# Patient Record
Sex: MALE | Race: WHITE
[De-identification: names, ages, dates, MRNs, and addresses within clinical notes are randomized per-mention and may not be internally consistent; named-entity substitution may affect disease eponyms.]

---

## 2017-11-06 NOTE — EDM.PDOC
ED HPI GENERAL MEDICAL PROBLEM





- General


Chief Complaint: General


Stated Complaint: FACIAL SWELLING


Time Seen by Provider: 11/06/17 05:30


Source of Information: Reports: Patient


History Limitations: Reports: No Limitations





- History of Present Illness


INITIAL COMMENTS - FREE TEXT/NARRATIVE: 





Patient is a 51 year old man who has had a swelling of the left face over the 

maxillary sinus. He has no pain but has had tooth abscesses in the past.  No 

fever or chills but there is pressure under the left eye. No nasal drainage.


Onset: Gradual


Onset Date: 11/05/17


Onset Time: 20:00


Duration: Day(s): (1), Getting Worse


Location: Reports: Face


Quality: Reports: Dull


Severity: Mild


Improves with: Reports: Heat Therapy


Context: Reports: Other (History of this happenning last year at the same time.)


Associated Symptoms: Reports: No Other Symptoms


Treatments PTA: Reports: Other (see below) (Heat pack.)





- Related Data


 Allergies











Allergy/AdvReac Type Severity Reaction Status Date / Time


 


No Known Allergies Allergy   Verified 11/06/17 05:37











Home Meds: 


 Home Meds





NK [No Known Home Meds]  11/06/17 [History]











ED ROS GENERAL





- Review of Systems


Review Of Systems: See Below


Constitutional: Reports: No Symptoms


HEENT: Reports: Other (Face pain.)


Respiratory: Reports: No Symptoms


Cardiovascular: Reports: No Symptoms


Endocrine: Reports: No Symptoms


GI/Abdominal: Reports: No Symptoms


: Reports: No Symptoms


Musculoskeletal: Reports: No Symptoms


Skin: Reports: No Symptoms


Neurological: Reports: No Symptoms


Psychiatric: Reports: No Symptoms


Hematologic/Lymphatic: Reports: No Symptoms


Immunologic: Reports: No Symptoms





ED EXAM, GENERAL





- Physical Exam


Exam: See Below


Exam Limited By: No Limitations


General Appearance: Alert, WD/WN, No Apparent Distress


Eye Exam: Bilateral Eye: EOMI, Normal Fundi, Normal Inspection, PERRL


Ears: Normal External Exam, Normal Canal, Hearing Grossly Normal, Normal TMs


Ear Exam: Bilateral Ear: Auricle Normal, Canal Normal, TM normal


Nose: Normal Inspection, Normal Mucosa, No Blood


Throat/Mouth: Normal Inspection, Normal Lips, Normal Teeth, Normal Gums, Normal 

Oropharynx, Normal Voice, No Airway Compromise


Head: Facial Swelling (He has mild swelling over the left maxillary sinus area 

and swelling of the left upper gums in his mouth.)


Neck: Normal Inspection, Supple, Non-Tender, Full Range of Motion


Respiratory/Chest: No Respiratory Distress, Lungs Clear, Normal Breath Sounds, 

No Accessory Muscle Use, Chest Non-Tender


Cardiovascular: Normal Peripheral Pulses, Regular Rate, Rhythm, No Edema, No 

Gallop, No JVD, No Murmur, No Rub


GI/Abdominal: Normal Bowel Sounds, Soft, Non-Tender, No Organomegaly, No 

Distention, No Abnormal Bruit, No Mass


Extremities: Normal Inspection, Normal Range of Motion, Non-Tender, Normal 

Capillary Refill, No Pedal Edema


Neurological: Alert, Oriented, CN II-XII Intact, Normal Cognition, Normal Gait, 

Normal Reflexes, No Motor/Sensory Deficits


Psychiatric: Normal Affect, Normal Mood


Skin Exam: Warm


Lymphatic: No Adenopathy





Course





- Vital Signs


Text/Narrative:: 





Uneventful ED course. This is most likely a dental abscess that may be causing 

a sinusitis.  He will see a dentist ASAP.  He will be put on Augmentin 875 mg 

po bid x 10 days, hot pack the left side of his face and take tylenol 500 mg po 

q 4 hours or Ibuprofen 800 mg po q 6 hours for pain.  Return to ED if worsening.





Departure





- Departure


Time of Disposition: 06:01


Disposition: Home, Self-Care 01


Condition: Good


Clinical Impression: 


 Dental abscess, Sinusitis








- Discharge Information


Forms:  ED Department Discharge

## 2019-07-29 ENCOUNTER — HOSPITAL ENCOUNTER (EMERGENCY)
Dept: HOSPITAL 60 - LB.ED | Age: 54
Discharge: HOME | End: 2019-07-29
Payer: COMMERCIAL

## 2019-07-29 DIAGNOSIS — Z79.899: ICD-10-CM

## 2019-07-29 DIAGNOSIS — R07.1: Primary | ICD-10-CM

## 2019-07-29 PROCEDURE — 80053 COMPREHEN METABOLIC PANEL: CPT

## 2019-07-29 PROCEDURE — 93005 ELECTROCARDIOGRAM TRACING: CPT

## 2019-07-29 PROCEDURE — 85025 COMPLETE CBC W/AUTO DIFF WBC: CPT

## 2019-07-29 PROCEDURE — 99285 EMERGENCY DEPT VISIT HI MDM: CPT

## 2019-07-29 PROCEDURE — 84484 ASSAY OF TROPONIN QUANT: CPT

## 2019-07-29 PROCEDURE — 71045 X-RAY EXAM CHEST 1 VIEW: CPT

## 2019-07-29 PROCEDURE — 36415 COLL VENOUS BLD VENIPUNCTURE: CPT

## 2019-07-29 NOTE — EDM.PDOC
ED HPI GENERAL MEDICAL PROBLEM





- General


Chief Complaint: Chest Pain


Stated Complaint: CHEST PAIN


Time Seen by Provider: 07/29/19 10:55


Source of Information: Reports: Patient, RN


History Limitations: Reports: No Limitations





- History of Present Illness


INITIAL COMMENTS - FREE TEXT/NARRATIVE: 


53 yr male presents with chest pain, midsternal.  Pain started with using the 

chain saw today.  He does take Atorvastatin X 15 years.  Hx of tia.  NSR noted 

per EKG, no ST changes noted. Chest x-ray completed. Labs drawn.  Pain with 

movement of sitting forward for x-ray.  No shortness of breath today with this.

  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 

boat, but subsided easily.  Today the pain didn't go away.  Pain was 4-5/10 and 

now 2/10.  He did take Aleve 2 tabs this am. He does have hx of right wrist 

pain and neck pain and states he took the Aleve for this pain.  








- Related Data


 Allergies











Allergy/AdvReac Type Severity Reaction Status Date / Time


 


No Known Allergies Allergy   Verified 07/29/19 10:51











Home Meds: 


 Home Meds





atorvaSTATin [Lipitor] 20 mg PO DAILY 07/29/19 [History]











ED ROS GENERAL





- Review of Systems


Review Of Systems: See Below


Constitutional: Denies: Fever, Decreased Appetite


HEENT: Denies: Sinus Problem, Throat Pain, Throat Swelling


Respiratory: Denies: Shortness of Breath, Wheezing, Cough


Cardiovascular: Reports: Chest Pain, Other (Tia in 2001).  Denies: Dyspnea on 

Exertion, Edema, Lightheadedness


Endocrine: Reports: No Symptoms


GI/Abdominal: Reports: No Symptoms


: Reports: No Symptoms


Musculoskeletal: Reports: Neck Pain, Other (right thumb pain)


Skin: Reports: No Symptoms


Neurological: Reports: No Symptoms


Psychiatric: Reports: No Symptoms


Hematologic/Lymphatic: Reports: No Symptoms


Immunologic: Reports: No Symptoms





ED EXAM, GENERAL





- Physical Exam


Exam: See Below


Exam Limited By: No Limitations


General Appearance: Alert, No Apparent Distress


Ears: Hearing Grossly Normal


Nose: Normal Inspection, Normal Mucosa


Throat/Mouth: Normal Inspection, Normal Voice, No Airway Compromise


Head: Atraumatic, Normocephalic


Neck: Supple, Non-Tender


Respiratory/Chest: No Respiratory Distress, Lungs Clear, Normal Breath Sounds


Cardiovascular: Normal Peripheral Pulses, Regular Rate, Rhythm, No Edema


Peripheral Pulses: 2+: Radial (L), Radial (R)


GI/Abdominal: Normal Bowel Sounds, Soft, Non-Tender


Back Exam: Normal Inspection, Full Range of Motion


Extremities: Normal Inspection, Normal Range of Motion, Normal Capillary Refill


Neurological: Alert, Oriented, Normal Cognition


Psychiatric: Normal Affect, Normal Mood


Skin Exam: Warm, Dry, Intact, Normal Color


Lymphatic: No Adenopathy





Course





- Orders/Labs/Meds


Orders: 


 Active Orders 24 hr











 Category Date Time Status


 


 Cardiac Monitoring [RC] .As Directed Care  07/29/19 10:59 Active


 


 EKG Documentation Completion [RC] ASDIRECTED Care  07/29/19 10:52 Active


 


 CXR [Chest 1V Frontal] [CR] Stat Exams  07/29/19 10:51 Taken


 


 Sodium Chloride 0.9% [Saline Flush] Med  07/29/19 10:59 Active





 10 ml FLUSH ASDIRECTED PRN   


 


 Saline Lock Insert [OM.PC] Routine Oth  07/29/19 10:59 Ordered








 Medication Orders





Sodium Chloride (Saline Flush)  10 ml FLUSH ASDIRECTED PRN


   PRN Reason: Keep Vein Open








Labs: 


 Laboratory Tests











  07/29/19 07/29/19 Range/Units





  10:59 10:59 


 


WBC  6.8   (4.0-11.0)  K/uL


 


RBC  5.09   (4.50-6.50)  M/uL


 


Hgb  16.0   (13.0-18.0)  g/dL


 


Hct  45.6   (40.0-54.0)  %


 


MCV  90   (76-96)  fL


 


MCH  31.4   (27.0-32.0)  pg


 


MCHC  35.1 H   (31.0-35.0)  g/dL


 


RDW  12.2   (11.0-16.0)  %


 


Plt Count  173   (150-400)  K/uL


 


MPV  11.1 H   (6.0-10.0)  fL


 


Neut % (Auto)  66.6   (45.0-70.0)  %


 


Lymph % (Auto)  21.0   (20.0-40.0)  %


 


Mono % (Auto)  9.3   (3.0-10.0)  %


 


Eos % (Auto)  2.8   (1.0-5.0)  %


 


Baso % (Auto)  0.3   (0.0-0.5)  %


 


Neut # (Auto)  4.50   (2.00-7.50)  K/uL


 


Lymph # (Auto)  1.42 L   (1.50-4.00)  K/uL


 


Mono # (Auto)  0.63   (0.20-0.80)  K/uL


 


Eos # (Auto)  0.19   (0.04-0.40)  K/uL


 


Baso # (Auto)  0.02   (0.02-0.10)  K/uL


 


Sodium   138  (136-145)  mmol/L


 


Potassium   3.9  (3.5-5.1)  mmol/L


 


Chloride   102  ()  mmol/L


 


Carbon Dioxide   24.5  (21.0-32.0)  mmol/L


 


Anion Gap   15.4 H  (5.0-15.0)  mmol/L


 


BUN   22  (8-26)  mg/dL


 


Creatinine   0.97  (0.70-1.30)  mg/dL


 


Est Cr Clr Drug Dosing   TNP  


 


Estimated GFR (MDRD)   > 60  (>60)  MLS/MIN


 


BUN/Creatinine Ratio   22.7  (6-25)  


 


Glucose   108 H  ()  mg/dL


 


Calcium   9.4  (8.5-10.1)  mg/dL


 


Total Bilirubin   0.6  (0.0-1.0)  mg/dL


 


AST   28  (15-37)  U/L


 


ALT   30  (12-78)  U/L


 


Alkaline Phosphatase   95  ()  U/L


 


Troponin I   < 0.017  (0.000-0.060)  ng/mL


 


Total Protein   7.3  (6.4-8.2)  g/dL


 


Albumin   4.1  (3.4-5.0)  g/dL


 


Globulin   3.2  (2.2-4.2)  g/dL


 


Albumin/Globulin Ratio   1.3  (0.8-2.0)  











Meds: 


Medications











Generic Name Dose Route Start Last Admin





  Trade Name Freq  PRN Reason Stop Dose Admin


 


Sodium Chloride  10 ml  07/29/19 10:59  





  Saline Flush  FLUSH   





  ASDIRECTED PRN   





  Keep Vein Open   





     





     





     














- Re-Assessments/Exams


Free Text/Narrative Re-Assessment/Exam: 





07/29/19  11:30  EKG, chest x-ray and labs reviewed.  NSR, no ST changes, no BBB

, troponin are negative, no other significant findings.  Pt states he can 

taking Aleve at home for the pain, just wanted to make sure he wasn't having a 

MI.  Reassured pt that labs and EKG are normal today.  Recommend F/U with PCP 

in next 1-2 weeks for follow-up.  Costochondritis pain today, recommend Aleve 

bid or Ibuprofen tid as needed, take with food.  May use ice/heat to area as 

needed.  Limit activity that causes the pain.  Let area rest and heal.  RTC or 

ER if symptoms worsen with chest pain into arm or up neck or worsening of chest 

pain.














Departure





- Departure


Time of Disposition: 11:47


Disposition: Home, Self-Care 01


Condition: Good


Clinical Impression: 


 Costochondral chest pain





Instructions:  Costochondritis, Easy-to-Read, Nonspecific Chest Pain, Easy-to-

Read


Referrals: 


PCP,None [Primary Care Provider] - 


Forms:  ED Department Discharge


Additional Instructions: 


May take Aleve or Ibuprofen for pain.


Limit the activity until you start to feel better.


Seek medical attention if you develop chest pain with shortness of breath, 

nausea, pain down left arm or neck, or dizziness.


Follow up with your primary doctor in the next couple of week.





- My Orders


Last 24 Hours: 


My Active Orders





07/29/19 10:51


CXR [Chest 1V Frontal] [CR] Stat 





07/29/19 10:52


EKG Documentation Completion [RC] ASDIRECTED 





07/29/19 10:59


Cardiac Monitoring [RC] .As Directed 


Sodium Chloride 0.9% [Saline Flush]   10 ml FLUSH ASDIRECTED PRN 


Saline Lock Insert [OM.PC] Routine 














- Assessment/Plan


Last 24 Hours: 


My Active Orders





07/29/19 10:51


CXR [Chest 1V Frontal] [CR] Stat 





07/29/19 10:52


EKG Documentation Completion [RC] ASDIRECTED 





07/29/19 10:59


Cardiac Monitoring [RC] .As Directed 


Sodium Chloride 0.9% [Saline Flush]   10 ml FLUSH ASDIRECTED PRN 


Saline Lock Insert [OM.PC] Routine 











Plan: 


 Costochondritis:  Recommend Ibuprofen or Aleve per label as needed for relief 

of pain.  Ice/heat to area as needed for comfort.  RTC if increase in chest 

pain.  Limit movement that causes the pain.  PCP referral in next 1-2 weeks for 

follow-up.

## 2019-07-30 NOTE — CR
Date of Service: 07/29/2019



Clinical Data:Chest Pain





AP PORTABLE CHEST:



The heart size is normal.  





The lungs are clear.  No pneumothorax.  No pleural effusions.  





No evidence of acute intrathoracic disease.  









[id number]

MTDD

## 2022-02-04 ENCOUNTER — HOSPITAL ENCOUNTER (EMERGENCY)
Dept: HOSPITAL 60 - LB.ED | Age: 57
Discharge: HOME | End: 2022-02-04
Payer: COMMERCIAL

## 2022-02-04 DIAGNOSIS — E78.00: ICD-10-CM

## 2022-02-04 DIAGNOSIS — Z86.73: ICD-10-CM

## 2022-02-04 DIAGNOSIS — S92.421A: Primary | ICD-10-CM
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
